# Patient Record
Sex: MALE | Race: BLACK OR AFRICAN AMERICAN | NOT HISPANIC OR LATINO
[De-identification: names, ages, dates, MRNs, and addresses within clinical notes are randomized per-mention and may not be internally consistent; named-entity substitution may affect disease eponyms.]

---

## 2019-04-24 ENCOUNTER — APPOINTMENT (OUTPATIENT)
Dept: PULMONOLOGY | Facility: CLINIC | Age: 29
End: 2019-04-24
Payer: COMMERCIAL

## 2019-04-24 PROCEDURE — 94727 GAS DIL/WSHOT DETER LNG VOL: CPT

## 2019-04-24 PROCEDURE — 94010 BREATHING CAPACITY TEST: CPT

## 2019-04-24 PROCEDURE — 94729 DIFFUSING CAPACITY: CPT

## 2019-04-24 PROCEDURE — 85018 HEMOGLOBIN: CPT | Mod: QW

## 2019-12-21 ENCOUNTER — TRANSCRIPTION ENCOUNTER (OUTPATIENT)
Age: 29
End: 2019-12-21

## 2020-01-21 ENCOUNTER — TRANSCRIPTION ENCOUNTER (OUTPATIENT)
Age: 30
End: 2020-01-21

## 2020-04-26 ENCOUNTER — MESSAGE (OUTPATIENT)
Age: 30
End: 2020-04-26

## 2020-05-05 ENCOUNTER — APPOINTMENT (OUTPATIENT)
Dept: DISASTER EMERGENCY | Facility: HOSPITAL | Age: 30
End: 2020-05-05

## 2020-05-06 LAB
SARS-COV-2 IGG SERPL IA-ACNC: <0.1 INDEX
SARS-COV-2 IGG SERPL QL IA: NEGATIVE

## 2020-07-16 ENCOUNTER — TRANSCRIPTION ENCOUNTER (OUTPATIENT)
Age: 30
End: 2020-07-16

## 2020-12-04 ENCOUNTER — TRANSCRIPTION ENCOUNTER (OUTPATIENT)
Age: 30
End: 2020-12-04

## 2021-06-01 ENCOUNTER — TRANSCRIPTION ENCOUNTER (OUTPATIENT)
Age: 31
End: 2021-06-01

## 2022-05-25 ENCOUNTER — NON-APPOINTMENT (OUTPATIENT)
Age: 32
End: 2022-05-25

## 2022-05-26 ENCOUNTER — NON-APPOINTMENT (OUTPATIENT)
Age: 32
End: 2022-05-26

## 2022-06-01 ENCOUNTER — NON-APPOINTMENT (OUTPATIENT)
Age: 32
End: 2022-06-01

## 2022-06-22 ENCOUNTER — APPOINTMENT (OUTPATIENT)
Dept: UROLOGY | Facility: CLINIC | Age: 32
End: 2022-06-22
Payer: COMMERCIAL

## 2022-06-22 VITALS
HEART RATE: 58 BPM | DIASTOLIC BLOOD PRESSURE: 63 MMHG | SYSTOLIC BLOOD PRESSURE: 106 MMHG | HEIGHT: 64 IN | BODY MASS INDEX: 26.46 KG/M2 | WEIGHT: 155 LBS

## 2022-06-22 PROCEDURE — 99204 OFFICE O/P NEW MOD 45 MIN: CPT

## 2022-06-22 NOTE — ASSESSMENT
[FreeTextEntry1] : \par plan:\par - ordered scrotal ultrasound\par - will update results over the phone\par - no need for work up for the split stream mentioned, if ever worsens or the patient interested we can do a cystoscopy to confirm no findings

## 2022-06-22 NOTE — HISTORY OF PRESENT ILLNESS
[FreeTextEntry1] : 33 yo M for initial consultation\par no PMH\par no PSH\par NKDA\par not a smoker\par \par no previous urologic history\par his ex girlfriend had trichomonas several years ago\par he was tested then and was negative\par was treated for \par \par complaining of split urine stream sometimes, no other urinary complains\par also mentioning intermittent scrotal pain, did not have anything special since 11/2021\par \par plan:\par - ordered scrotal ultrasound\par - will update results over the phone\par - no need for work up for the split stream mentioned, if ever worsens or the patient interested we can do a cystoscopy to confirm no findings

## 2022-06-22 NOTE — PHYSICAL EXAM
[General Appearance - Well Developed] : well developed [General Appearance - Well Nourished] : well nourished [Normal Appearance] : normal appearance [Well Groomed] : well groomed [General Appearance - In No Acute Distress] : no acute distress [Edema] : no peripheral edema [] : no respiratory distress [Respiration, Rhythm And Depth] : normal respiratory rhythm and effort [Exaggerated Use Of Accessory Muscles For Inspiration] : no accessory muscle use [Urethral Meatus] : meatus normal [Penis Abnormality] : normal circumcised penis [Urinary Bladder Findings] : the bladder was normal on palpation [Scrotum] : the scrotum was normal [Rectal Exam - Seminal Vesicles] : the seminal vesicles were normal [Epididymis] : the epididymides were normal [Testes Tenderness] : no tenderness of the testes [Testes Mass (___cm)] : there were no testicular masses [Normal Station and Gait] : the gait and station were normal for the patient's age [Oriented To Time, Place, And Person] : oriented to person, place, and time [Affect] : the affect was normal [Mood] : the mood was normal [Not Anxious] : not anxious [Inguinal Lymph Nodes Enlarged Bilaterally] : inguinal

## 2022-09-26 ENCOUNTER — OUTPATIENT (OUTPATIENT)
Dept: OUTPATIENT SERVICES | Facility: HOSPITAL | Age: 32
LOS: 1 days | End: 2022-09-26
Payer: COMMERCIAL

## 2022-09-26 ENCOUNTER — APPOINTMENT (OUTPATIENT)
Dept: RADIOLOGY | Facility: CLINIC | Age: 32
End: 2022-09-26

## 2022-09-26 ENCOUNTER — APPOINTMENT (OUTPATIENT)
Dept: ULTRASOUND IMAGING | Facility: CLINIC | Age: 32
End: 2022-09-26

## 2022-09-26 DIAGNOSIS — N50.82 SCROTAL PAIN: ICD-10-CM

## 2022-09-26 DIAGNOSIS — R07.89 OTHER CHEST PAIN: ICD-10-CM

## 2022-09-26 PROCEDURE — 71046 X-RAY EXAM CHEST 2 VIEWS: CPT | Mod: 26

## 2022-09-26 PROCEDURE — 93975 VASCULAR STUDY: CPT | Mod: 26

## 2022-09-26 PROCEDURE — 71046 X-RAY EXAM CHEST 2 VIEWS: CPT

## 2022-09-26 PROCEDURE — 93975 VASCULAR STUDY: CPT

## 2023-01-19 ENCOUNTER — APPOINTMENT (OUTPATIENT)
Dept: FAMILY MEDICINE | Facility: CLINIC | Age: 33
End: 2023-01-19
Payer: COMMERCIAL

## 2023-01-19 VITALS
TEMPERATURE: 97.5 F | WEIGHT: 154 LBS | SYSTOLIC BLOOD PRESSURE: 108 MMHG | OXYGEN SATURATION: 100 % | HEIGHT: 64 IN | BODY MASS INDEX: 26.29 KG/M2 | HEART RATE: 64 BPM | DIASTOLIC BLOOD PRESSURE: 64 MMHG

## 2023-01-19 DIAGNOSIS — Z78.9 OTHER SPECIFIED HEALTH STATUS: ICD-10-CM

## 2023-01-19 DIAGNOSIS — Z13.21 ENCOUNTER FOR SCREENING FOR NUTRITIONAL DISORDER: ICD-10-CM

## 2023-01-19 DIAGNOSIS — Z13.220 ENCOUNTER FOR SCREENING FOR LIPOID DISORDERS: ICD-10-CM

## 2023-01-19 DIAGNOSIS — Z60.2 PROBLEMS RELATED TO LIVING ALONE: ICD-10-CM

## 2023-01-19 DIAGNOSIS — Z23 ENCOUNTER FOR IMMUNIZATION: ICD-10-CM

## 2023-01-19 DIAGNOSIS — Z13.29 ENCOUNTER FOR SCREENING FOR OTHER SUSPECTED ENDOCRINE DISORDER: ICD-10-CM

## 2023-01-19 DIAGNOSIS — N50.82 SCROTAL PAIN: ICD-10-CM

## 2023-01-19 DIAGNOSIS — R06.09 OTHER FORMS OF DYSPNEA: ICD-10-CM

## 2023-01-19 PROCEDURE — G0444 DEPRESSION SCREEN ANNUAL: CPT | Mod: 59

## 2023-01-19 PROCEDURE — 36415 COLL VENOUS BLD VENIPUNCTURE: CPT

## 2023-01-19 PROCEDURE — 99385 PREV VISIT NEW AGE 18-39: CPT | Mod: 25

## 2023-01-19 SDOH — SOCIAL STABILITY - SOCIAL INSECURITY: PROBLEMS RELATED TO LIVING ALONE: Z60.2

## 2023-01-19 NOTE — ASSESSMENT
[FreeTextEntry1] : Annual physical: f/u routine labwork\par Moderate asthma/heavy breathing: c/w albuterol hfa prn for sob/wheezing, start symbicort daily as prescribed, advised to go to ER if condition not resolved with hfa, cxr 9/22 negative\par BMI 26: Recommend low fat diet, wt loss, exercise, nutritional counseling provided, f/u lipid panel\par Mild bradycardia: manual repeat wnl, may be related to conditioning, f/u labwork\par Screen for STDs: f/u labwork\par Mild depression: recommend exercise, yoga, meditation, c/w therapist\par RTC 1 wk

## 2023-01-19 NOTE — HISTORY OF PRESENT ILLNESS
[FreeTextEntry1] : NPA/CPE [de-identified] : 31 yo male presents for annual physical. reports having hx of asthma. He says it has worsened over the past few months, he feels he has heavy breathing/tightness 3 - 4 times per week. He does take his rescue inhaler. He was prescribed an additional medication last year by his MD which he did not  due to price.

## 2023-01-19 NOTE — HEALTH RISK ASSESSMENT
[Very Good] : ~his/her~  mood as very good [Never] : Never [Yes] : Yes [2 - 4 times a month (2 pts)] : 2-4 times a month (2 points) [1 or 2 (0 pts)] : 1 or 2 (0 points) [Never (0 pts)] : Never (0 points) [No] : In the past 12 months have you used drugs other than those required for medical reasons? No [No falls in past year] : Patient reported no falls in the past year [1] : 2) Feeling down, depressed, or hopeless for several days (1) [PHQ-2 Positive] : PHQ-2 Positive [Several Days (1)] : 5.) Poor appetite or overeating? Several days [Not at All (0)] : 8.) Moving or speaking so slowly that other people could have noticed, or the opposite, moving or speaking faster than usual? Not at all [Mild] : severity of depression is mild [Not at all] : How difficult have these problems made it for you to do your work, take care of things at home, or get along with people? Not at all [I have developed a follow-up plan documented below in the note.] : I have developed a follow-up plan documented below in the note. [Audit-CScore] : 2 [de-identified] : previously regular, needs improvement [de-identified] : needs improvement [[id number]] : 2 [[id number]] : 3 [HIV Test offered] : HIV Test offered [Hepatitis C test offered] : Hepatitis C test offered [Alone] : lives alone [Employed] : employed [Significant Other] : lives with significant other [Sexually Active] : sexually active [High Risk Behavior] : no high risk behavior [Feels Safe at Home] : Feels safe at home [Fully functional (bathing, dressing, toileting, transferring, walking, feeding)] : Fully functional (bathing, dressing, toileting, transferring, walking, feeding) [Fully functional (using the telephone, shopping, preparing meals, housekeeping, doing laundry, using] : Fully functional and needs no help or supervision to perform IADLs (using the telephone, shopping, preparing meals, housekeeping, doing laundry, using transportation, managing medications and managing finances) [Reports changes in hearing] : Reports no changes in hearing [Reports changes in vision] : Reports no changes in vision [Reports changes in dental health] : Reports no changes in dental health

## 2023-01-29 LAB
25(OH)D3 SERPL-MCNC: 17.3 NG/ML
ALBUMIN SERPL ELPH-MCNC: 5.1 G/DL
ALP BLD-CCNC: 50 U/L
ALT SERPL-CCNC: 20 U/L
ANION GAP SERPL CALC-SCNC: 12 MMOL/L
APPEARANCE: CLEAR
AST SERPL-CCNC: 21 U/L
BACTERIA: NEGATIVE
BASOPHILS # BLD AUTO: 0.02 K/UL
BASOPHILS NFR BLD AUTO: 0.6 %
BILIRUB SERPL-MCNC: 0.6 MG/DL
BILIRUBIN URINE: NEGATIVE
BLOOD URINE: NEGATIVE
BUN SERPL-MCNC: 12 MG/DL
C TRACH RRNA SPEC QL NAA+PROBE: NOT DETECTED
CALCIUM SERPL-MCNC: 10.5 MG/DL
CHLORIDE SERPL-SCNC: 100 MMOL/L
CHOLEST SERPL-MCNC: 194 MG/DL
CO2 SERPL-SCNC: 25 MMOL/L
COLOR: YELLOW
CREAT SERPL-MCNC: 0.94 MG/DL
EGFR: 110 ML/MIN/1.73M2
EOSINOPHIL # BLD AUTO: 0.11 K/UL
EOSINOPHIL NFR BLD AUTO: 3.5 %
ESTIMATED AVERAGE GLUCOSE: 114 MG/DL
GLUCOSE QUALITATIVE U: NEGATIVE
GLUCOSE SERPL-MCNC: 89 MG/DL
HBA1C MFR BLD HPLC: 5.6 %
HBV CORE IGG+IGM SER QL: NONREACTIVE
HBV CORE IGM SER QL: NONREACTIVE
HBV SURFACE AB SER QL: REACTIVE
HBV SURFACE AG SER QL: NONREACTIVE
HCT VFR BLD CALC: 45.9 %
HCV AB SER QL: NONREACTIVE
HCV S/CO RATIO: 0.09 S/CO
HDLC SERPL-MCNC: 39 MG/DL
HGB BLD-MCNC: 15.2 G/DL
HIV1+2 AB SPEC QL IA.RAPID: NONREACTIVE
HYALINE CASTS: 0 /LPF
IMM GRANULOCYTES NFR BLD AUTO: 0 %
KETONES URINE: NEGATIVE
LDLC SERPL CALC-MCNC: 145 MG/DL
LEUKOCYTE ESTERASE URINE: NEGATIVE
LYMPHOCYTES # BLD AUTO: 1.49 K/UL
LYMPHOCYTES NFR BLD AUTO: 47.9 %
MAN DIFF?: NORMAL
MCHC RBC-ENTMCNC: 29.1 PG
MCHC RBC-ENTMCNC: 33.1 GM/DL
MCV RBC AUTO: 87.8 FL
MICROSCOPIC-UA: NORMAL
MONOCYTES # BLD AUTO: 0.29 K/UL
MONOCYTES NFR BLD AUTO: 9.3 %
N GONORRHOEA RRNA SPEC QL NAA+PROBE: NOT DETECTED
NEUTROPHILS # BLD AUTO: 1.2 K/UL
NEUTROPHILS NFR BLD AUTO: 38.7 %
NITRITE URINE: NEGATIVE
NONHDLC SERPL-MCNC: 155 MG/DL
PH URINE: 8
PLATELET # BLD AUTO: 346 K/UL
POTASSIUM SERPL-SCNC: 4.9 MMOL/L
PROT SERPL-MCNC: 8 G/DL
PROTEIN URINE: NORMAL
RBC # BLD: 5.23 M/UL
RBC # FLD: 11.9 %
RED BLOOD CELLS URINE: 1 /HPF
SODIUM SERPL-SCNC: 137 MMOL/L
SOURCE AMPLIFICATION: NORMAL
SPECIFIC GRAVITY URINE: 1.02
SQUAMOUS EPITHELIAL CELLS: 0 /HPF
T PALLIDUM AB SER QL IA: NEGATIVE
TRIGL SERPL-MCNC: 50 MG/DL
TSH SERPL-ACNC: 0.96 UIU/ML
UROBILINOGEN URINE: NORMAL
WBC # FLD AUTO: 3.11 K/UL
WHITE BLOOD CELLS URINE: 0 /HPF

## 2023-02-08 ENCOUNTER — TRANSCRIPTION ENCOUNTER (OUTPATIENT)
Age: 33
End: 2023-02-08

## 2023-02-08 ENCOUNTER — APPOINTMENT (OUTPATIENT)
Dept: FAMILY MEDICINE | Facility: CLINIC | Age: 33
End: 2023-02-08
Payer: COMMERCIAL

## 2023-02-08 PROCEDURE — 36415 COLL VENOUS BLD VENIPUNCTURE: CPT

## 2023-02-14 LAB
BASOPHILS # BLD AUTO: 0.02 K/UL
BASOPHILS NFR BLD AUTO: 0.5 %
EOSINOPHIL # BLD AUTO: 0.12 K/UL
EOSINOPHIL NFR BLD AUTO: 3.3 %
HCT VFR BLD CALC: 44 %
HGB BLD-MCNC: 15.2 G/DL
IMM GRANULOCYTES NFR BLD AUTO: 0 %
LYMPHOCYTES # BLD AUTO: 1.86 K/UL
LYMPHOCYTES NFR BLD AUTO: 50.7 %
MAN DIFF?: NORMAL
MCHC RBC-ENTMCNC: 30.2 PG
MCHC RBC-ENTMCNC: 34.5 GM/DL
MCV RBC AUTO: 87.5 FL
MONOCYTES # BLD AUTO: 0.3 K/UL
MONOCYTES NFR BLD AUTO: 8.2 %
NEUTROPHILS # BLD AUTO: 1.37 K/UL
NEUTROPHILS NFR BLD AUTO: 37.3 %
PLATELET # BLD AUTO: 323 K/UL
RBC # BLD: 5.03 M/UL
RBC # FLD: 11.9 %
WBC # FLD AUTO: 3.67 K/UL

## 2023-02-27 ENCOUNTER — APPOINTMENT (OUTPATIENT)
Dept: FAMILY MEDICINE | Facility: CLINIC | Age: 33
End: 2023-02-27
Payer: COMMERCIAL

## 2023-02-27 VITALS
SYSTOLIC BLOOD PRESSURE: 106 MMHG | HEART RATE: 62 BPM | TEMPERATURE: 97.2 F | BODY MASS INDEX: 26.63 KG/M2 | WEIGHT: 156 LBS | DIASTOLIC BLOOD PRESSURE: 70 MMHG | HEIGHT: 64 IN | OXYGEN SATURATION: 98 %

## 2023-02-27 PROCEDURE — 99214 OFFICE O/P EST MOD 30 MIN: CPT

## 2023-02-27 NOTE — HISTORY OF PRESENT ILLNESS
[FreeTextEntry1] : Follow up  [de-identified] : 31 yo male presents for follow up of asthma. He was prescribed symbicort at previous visit. He says he was unable to pick it up since he did not have the money at the time. He says he has it now and would like it resent. Denies fever, chills, cp, palpitations, sob, nvcd.\par

## 2023-02-27 NOTE — ASSESSMENT
[FreeTextEntry1] : Moderate asthma: was started on symbicort at previous visit, will resend to pharmacy, c/w albuterol hfa prn for sob/wheezing, advised to go to ER if condition is not resolved with hfa\par HLD: Recommend low fat diet, wt loss, exercise, nutritional counseling provided, repeat in 4 wks\par Neutropenia: persistent, HIV negative, refer to hematology\par RTC 4 wks

## 2023-04-23 ENCOUNTER — OUTPATIENT (OUTPATIENT)
Dept: OUTPATIENT SERVICES | Facility: HOSPITAL | Age: 33
LOS: 1 days | Discharge: HOME | End: 2023-04-23

## 2023-04-23 DIAGNOSIS — D72.89 OTHER SPECIFIED DISORDERS OF WHITE BLOOD CELLS: ICD-10-CM

## 2023-04-25 ENCOUNTER — NON-APPOINTMENT (OUTPATIENT)
Age: 33
End: 2023-04-25

## 2023-04-27 ENCOUNTER — NON-APPOINTMENT (OUTPATIENT)
Age: 33
End: 2023-04-27

## 2023-04-27 ENCOUNTER — APPOINTMENT (OUTPATIENT)
Dept: HEMATOLOGY ONCOLOGY | Facility: CLINIC | Age: 33
End: 2023-04-27
Payer: COMMERCIAL

## 2023-04-27 ENCOUNTER — RESULT REVIEW (OUTPATIENT)
Age: 33
End: 2023-04-27

## 2023-04-27 VITALS
BODY MASS INDEX: 29.64 KG/M2 | DIASTOLIC BLOOD PRESSURE: 74 MMHG | SYSTOLIC BLOOD PRESSURE: 116 MMHG | HEIGHT: 60 IN | TEMPERATURE: 97.3 F | WEIGHT: 151 LBS | HEART RATE: 56 BPM | OXYGEN SATURATION: 98 %

## 2023-04-27 LAB
BASOPHILS # BLD AUTO: 0 K/UL — SIGNIFICANT CHANGE UP (ref 0–0.2)
BASOPHILS NFR BLD AUTO: 0.9 % — SIGNIFICANT CHANGE UP (ref 0–2)
EOSINOPHIL # BLD AUTO: 0.1 K/UL — SIGNIFICANT CHANGE UP (ref 0–0.5)
EOSINOPHIL NFR BLD AUTO: 3.6 % — SIGNIFICANT CHANGE UP (ref 0–6)
HCT VFR BLD CALC: 43.8 % — SIGNIFICANT CHANGE UP (ref 39–50)
HGB BLD-MCNC: 15.2 G/DL — SIGNIFICANT CHANGE UP (ref 13–17)
LYMPHOCYTES # BLD AUTO: 1.5 K/UL — SIGNIFICANT CHANGE UP (ref 1–3.3)
LYMPHOCYTES # BLD AUTO: 41 % — SIGNIFICANT CHANGE UP (ref 13–44)
MCHC RBC-ENTMCNC: 29.7 PG — SIGNIFICANT CHANGE UP (ref 27–34)
MCHC RBC-ENTMCNC: 34.6 G/DL — SIGNIFICANT CHANGE UP (ref 32–36)
MCV RBC AUTO: 85.9 FL — SIGNIFICANT CHANGE UP (ref 80–100)
MONOCYTES # BLD AUTO: 0.4 K/UL — SIGNIFICANT CHANGE UP (ref 0–0.9)
MONOCYTES NFR BLD AUTO: 12.1 % — SIGNIFICANT CHANGE UP (ref 2–14)
NEUTROPHILS # BLD AUTO: 1.6 K/UL — LOW (ref 1.8–7.4)
NEUTROPHILS NFR BLD AUTO: 42.4 % — LOW (ref 43–77)
PLATELET # BLD AUTO: 332 K/UL — SIGNIFICANT CHANGE UP (ref 150–400)
RBC # BLD: 5.1 M/UL — SIGNIFICANT CHANGE UP (ref 4.2–5.8)
RBC # FLD: 10.4 % — SIGNIFICANT CHANGE UP (ref 10.3–14.5)
WBC # BLD: 3.7 K/UL — LOW (ref 3.8–10.5)
WBC # FLD AUTO: 3.7 K/UL — LOW (ref 3.8–10.5)

## 2023-04-27 PROCEDURE — 99203 OFFICE O/P NEW LOW 30 MIN: CPT

## 2023-05-01 ENCOUNTER — NON-APPOINTMENT (OUTPATIENT)
Age: 33
End: 2023-05-01

## 2023-05-01 DIAGNOSIS — Z86.19 PERSONAL HISTORY OF OTHER INFECTIOUS AND PARASITIC DISEASES: ICD-10-CM

## 2023-05-01 LAB
ANA SER IF-ACNC: NEGATIVE
ERYTHROCYTE [SEDIMENTATION RATE] IN BLOOD BY WESTERGREN METHOD: 14 MM/HR

## 2023-05-01 NOTE — ASSESSMENT
[FreeTextEntry1] : Mr. Correa is a 31 yo AAM referred by Dr. Sesay for neutropenia.\par review of prior CBC show that his ANC has ranged between 1.2 - 1.3 since Jan. 2023. No prior CBCs are available.\par he denies any recurrent or current infection. He is not any any medication known to cause neutropenia.\par he does not have any  history of or symptoms related to an auto immune disease.\par  Today his cbc shows a WBC count of 3.7, HGB-15.2, HCT-43.8, plat-332,000, MCV-85.9, ANC- 1.6.\par I am sending off an JUAN ANTONIO and ESR to R/o an auto immune etiology for the mild neutropenia.\par Also sending off Flow Cytometry to evaluate for an inherent bone marrow cause.\par If all comes back negative , may be ethnic neutropenia. \par His ANC never dropped below 1,000. \par Asked patient to see if he could get prior CBCs from a year or more ago so we could compare.\par Will monitor CBC.

## 2023-05-01 NOTE — CONSULT LETTER
[Dear  ___] : Dear  [unfilled], [Consult Letter:] : I had the pleasure of evaluating your patient, [unfilled]. [Please see my note below.] : Please see my note below. [Consult Closing:] : Thank you very much for allowing me to participate in the care of this patient.  If you have any questions, please do not hesitate to contact me. [Sincerely,] : Sincerely, [FreeTextEntry2] : Dr. Sesay [FreeTextEntry3] : Brissa GROVES [FreeTextEntry1] : Please find attached our consultation on your patient, Mr. JEB [place] . \par We take a multidisciplinary team approach to patient care and consider you, the referring physician, an extension of our team. \par It is our commitment to provide your patient with the highest quality of advanced therapeutic options. We thank you for allowing us to participate in the care of your patient.\par Please do not hesitate to contact our team with any questions or concerns at [phone].\par \par \par

## 2023-05-01 NOTE — HISTORY OF PRESENT ILLNESS
[de-identified] : Mr. Correa is a 31 yo AAM referred by Dr. Sesay for neutropenia.\par review of prior CBC show that his ANC has ranged between 1.2 - 1.3 since Jan. 2023. No prior CBCs are available.\par he denies any recurrent or current infection. He is not any any medication known to cause neutropenia.\par he does not have any  history of or symptoms related to an auto immune disease. [de-identified] : No symptoms related to infection, no fevers chills or night sweats.

## 2023-05-02 ENCOUNTER — APPOINTMENT (OUTPATIENT)
Dept: ORTHOPEDIC SURGERY | Facility: CLINIC | Age: 33
End: 2023-05-02
Payer: COMMERCIAL

## 2023-05-02 VITALS
DIASTOLIC BLOOD PRESSURE: 70 MMHG | WEIGHT: 151 LBS | HEIGHT: 60 IN | HEART RATE: 51 BPM | BODY MASS INDEX: 29.64 KG/M2 | SYSTOLIC BLOOD PRESSURE: 110 MMHG

## 2023-05-02 DIAGNOSIS — S63.633D SPRAIN OF INTERPHALANGEAL JOINT OF LEFT MIDDLE FINGER, SUBSEQUENT ENCOUNTER: ICD-10-CM

## 2023-05-02 DIAGNOSIS — S63.635D SPRAIN OF INTERPHALANGEAL JOINT OF LEFT RING FINGER, SUBSEQUENT ENCOUNTER: ICD-10-CM

## 2023-05-02 PROCEDURE — 99203 OFFICE O/P NEW LOW 30 MIN: CPT

## 2023-05-02 PROCEDURE — 73130 X-RAY EXAM OF HAND: CPT | Mod: LT

## 2023-05-03 NOTE — ASSESSMENT
[FreeTextEntry1] : Patient presents with jamming injuries to the left middle and ring fingers. Their symptoms are consistent with a sprain of both PIP joints. The nature of this condition was described at length with the patient and they verbalized understanding. \par \par Recommendations: \par -ROM exercises (HEP) that were described/demonstrated for the patient at length at this evening's visit\par -Rx for Hand Therapy was given to the patient. To work on ROM and swelling abatement to improve activity tolerance\par -OTC anti-inflammatories for discomfort\par -Followup with Dr. Galindo in 6 weeks for ROM check and further evaluation (if needed).\par -Patient was given ample time to ask questions and all questions were answered to the patient's full satisfaction.\par \par The patient was in full agreement with this plan and appreciative of their care.\par

## 2023-05-03 NOTE — HISTORY OF PRESENT ILLNESS
[FreeTextEntry1] : 05/02/2023: Patient is a 32 year-old, ambidextrous male who presents to the office today for initial evaluation of injuries to his left middle and ring fingers. He was playing basketball back in February when a ball came in and jammed both of his fingers on the left hand. He was seen at Urgent Care by the PA and diagnosed with a left middle finger jamming injury. There was no xray tech on site for Urgent Care this day and he was referred to our clinic for further evaluation. Patient complains of PIP joint stiffness of both fingers due to slightly decreasing ROM from the stiffness. He denies any upper extremity paresthesias.\par \par No bleeding, fever, chills, sweats, nausea or vomiting were endorsed at this visit. The above history is in addition to the intake form, which I personally reviewed at length, including the patient's medical, surgical, and family history. The patient's allergies were also carefully reviewed. In addition, the family and social history of the patient were also reviewed, which are non-contributory to this visit unless specified above. All of this has been documented accordingly in the visit note.\par

## 2023-05-03 NOTE — PHYSICAL EXAM
[Normal Finger/nose] : finger to nose coordination [Normal] : no peripheral adenopathy appreciated [de-identified] : Left Hand Exam\par \par Skin: Intact with no erythema, no ecchymosis\par ROM: Full ROM of the digits and wrist. Patient able to make a full composite fist, however, subjectively complains that he feels like there is something blocking him from full flexion at the PIP joint.\par Tenderness: No TTP to PIP joints, no TTP to DIP joints, no TTP to MCP joints, negative basal joint grind test.  \par Sensation: Grossly intact without deficits\par Capillary Refill: Brisk, <2 seconds\par  [de-identified] : LETTYR [de-identified] : 3 xray views of the left middle and ring fingers were obtained.\par \par -No fractures or dislocations are noted

## 2023-05-04 ENCOUNTER — NON-APPOINTMENT (OUTPATIENT)
Age: 33
End: 2023-05-04

## 2023-05-07 PROBLEM — S63.633D SPRAIN OF INTERPHALANGEAL JOINT OF LEFT MIDDLE FINGER, SUBSEQUENT ENCOUNTER: Status: ACTIVE | Noted: 2023-05-02

## 2023-07-23 ENCOUNTER — OUTPATIENT (OUTPATIENT)
Dept: OUTPATIENT SERVICES | Facility: HOSPITAL | Age: 33
LOS: 1 days | Discharge: HOME | End: 2023-07-23

## 2023-07-23 DIAGNOSIS — D72.89 OTHER SPECIFIED DISORDERS OF WHITE BLOOD CELLS: ICD-10-CM

## 2023-07-28 ENCOUNTER — RESULT REVIEW (OUTPATIENT)
Age: 33
End: 2023-07-28

## 2023-07-28 ENCOUNTER — APPOINTMENT (OUTPATIENT)
Dept: HEMATOLOGY ONCOLOGY | Facility: CLINIC | Age: 33
End: 2023-07-28
Payer: COMMERCIAL

## 2023-07-28 VITALS
OXYGEN SATURATION: 97 % | HEIGHT: 66.14 IN | HEART RATE: 53 BPM | DIASTOLIC BLOOD PRESSURE: 75 MMHG | BODY MASS INDEX: 24.39 KG/M2 | WEIGHT: 151.79 LBS | SYSTOLIC BLOOD PRESSURE: 123 MMHG

## 2023-07-28 LAB
BASOPHILS # BLD AUTO: 0.1 K/UL — SIGNIFICANT CHANGE UP (ref 0–0.2)
BASOPHILS NFR BLD AUTO: 1.9 % — SIGNIFICANT CHANGE UP (ref 0–2)
EOSINOPHIL # BLD AUTO: 0.1 K/UL — SIGNIFICANT CHANGE UP (ref 0–0.5)
EOSINOPHIL NFR BLD AUTO: 2.2 % — SIGNIFICANT CHANGE UP (ref 0–6)
HCT VFR BLD CALC: 46.8 % — SIGNIFICANT CHANGE UP (ref 39–50)
HGB BLD-MCNC: 17 G/DL — SIGNIFICANT CHANGE UP (ref 13–17)
LYMPHOCYTES # BLD AUTO: 1.9 K/UL — SIGNIFICANT CHANGE UP (ref 1–3.3)
LYMPHOCYTES # BLD AUTO: 30.9 % — SIGNIFICANT CHANGE UP (ref 13–44)
MCHC RBC-ENTMCNC: 31.4 PG — SIGNIFICANT CHANGE UP (ref 27–34)
MCHC RBC-ENTMCNC: 36.3 G/DL — HIGH (ref 32–36)
MCV RBC AUTO: 86.6 FL — SIGNIFICANT CHANGE UP (ref 80–100)
MONOCYTES # BLD AUTO: 0.4 K/UL — SIGNIFICANT CHANGE UP (ref 0–0.9)
MONOCYTES NFR BLD AUTO: 7.1 % — SIGNIFICANT CHANGE UP (ref 2–14)
NEUTROPHILS # BLD AUTO: 3.5 K/UL — SIGNIFICANT CHANGE UP (ref 1.8–7.4)
NEUTROPHILS NFR BLD AUTO: 57.9 % — SIGNIFICANT CHANGE UP (ref 43–77)
PLATELET # BLD AUTO: 339 K/UL — SIGNIFICANT CHANGE UP (ref 150–400)
RBC # BLD: 5.4 M/UL — SIGNIFICANT CHANGE UP (ref 4.2–5.8)
RBC # FLD: 10.4 % — SIGNIFICANT CHANGE UP (ref 10.3–14.5)
WBC # BLD: 6 K/UL — SIGNIFICANT CHANGE UP (ref 3.8–10.5)
WBC # FLD AUTO: 6 K/UL — SIGNIFICANT CHANGE UP (ref 3.8–10.5)

## 2023-07-28 PROCEDURE — 99214 OFFICE O/P EST MOD 30 MIN: CPT

## 2023-08-03 NOTE — ASSESSMENT
[FreeTextEntry1] : Mr. Correa is a 33 yo AAM referred by Dr. Sesay for neutropenia. review of prior CBC show that his ANC has ranged between 1.2 - 1.3 since Jan. 2023. No prior CBCs are available. he denies any recurrent or current infection. He is not any any medication known to cause neutropenia. he does not have any  history of or symptoms related to an auto immune disease.   4/27/23 Flow Cytometry Peripheral blood:      - The lymphocyte immunophenotypic findings show no diagnostic abnormalities.      - The myeloid immunophenotypic findings show normal myeloid granularity, no increase in myeloid immaturity, and normal myeloid antigen maturation pattern. INTERPRETATION: MORPHOLOGY: No significant absolute lymphocytosis, or monocytosis. Neutropenia is noted. -JUAN ANTONIO and ESR normal on 4/27/23 -If all comes back negative , may be ethnic neutropenia. ANC never dropped below 1,000.  - Today's CBC: WBC 6.0 , Hg 17 , HCT 46.8 , plt 339, ANC 3.6 - Advised to repeat CBC in 6 months. Patient wishes to have PCP monitor CBC - F/U PRN

## 2023-08-03 NOTE — HISTORY OF PRESENT ILLNESS
[de-identified] : Mr. Correa is a 33 yo AAM referred by Dr. Sesay for neutropenia.\par review of prior CBC show that his ANC has ranged between 1.2 - 1.3 since Jan. 2023. No prior CBCs are available.\par he denies any recurrent or current infection. He is not any any medication known to cause neutropenia.\par he does not have any  history of or symptoms related to an auto immune disease. [de-identified] : Patient presents for f/u  Today's CBC: WBC 6.0 , Hg 17 , HCT 46.8 , plt 339, ANC 3.6  Reports PND last week, took Mucinex and resolved  Changed diet- following nutritionist , low carb, high protein//fiber  No symptoms related to infection, no fevers chills or night sweats.  4/27/23 Flow Cytometry Peripheral blood:      - The lymphocyte immunophenotypic findings show no diagnostic abnormalities.      - The myeloid immunophenotypic findings show normal myeloid granularity, no increase in myeloid immaturity, and normal myeloid antigen maturation pattern. INTERPRETATION: MORPHOLOGY: No significant absolute lymphocytosis, or monocytosis. Neutropenia is noted.

## 2023-08-09 ENCOUNTER — APPOINTMENT (OUTPATIENT)
Dept: FAMILY MEDICINE | Facility: CLINIC | Age: 33
End: 2023-08-09

## 2023-08-24 ENCOUNTER — APPOINTMENT (OUTPATIENT)
Dept: FAMILY MEDICINE | Facility: CLINIC | Age: 33
End: 2023-08-24
Payer: COMMERCIAL

## 2023-08-24 VITALS
HEIGHT: 66 IN | OXYGEN SATURATION: 98 % | WEIGHT: 150 LBS | BODY MASS INDEX: 24.11 KG/M2 | DIASTOLIC BLOOD PRESSURE: 70 MMHG | HEART RATE: 75 BPM | SYSTOLIC BLOOD PRESSURE: 110 MMHG

## 2023-08-24 DIAGNOSIS — Z02.1 ENCOUNTER FOR PRE-EMPLOYMENT EXAMINATION: ICD-10-CM

## 2023-08-24 DIAGNOSIS — R06.89 OTHER ABNORMALITIES OF BREATHING: ICD-10-CM

## 2023-08-24 PROCEDURE — 99214 OFFICE O/P EST MOD 30 MIN: CPT

## 2023-08-24 RX ORDER — VITAMIN K2 90 MCG
125 MCG CAPSULE ORAL
Qty: 90 | Refills: 0 | Status: DISCONTINUED | COMMUNITY
Start: 2023-01-29 | End: 2023-08-24

## 2023-08-24 RX ORDER — DICLOFENAC SODIUM 75 MG/1
75 TABLET, DELAYED RELEASE ORAL
Qty: 60 | Refills: 0 | Status: DISCONTINUED | COMMUNITY
Start: 2023-05-02 | End: 2023-08-24

## 2023-08-24 NOTE — ASSESSMENT
[FreeTextEntry1] : HLD: Recommend low fat diet, wt loss, exercise, nutritional counseling provided, f/u lipid panel Asthma: well controlled, c/w albuterol hfa prn for sob/wheezing, c/w symbicort as prescribed, advised to go to ER if condition is not resolved with hfa Vitamin D def: f/u level Pre-employment: f/u quantiferon RTC 3 wks

## 2023-08-24 NOTE — HISTORY OF PRESENT ILLNESS
[FreeTextEntry1] : Follow up  [de-identified] : 32 yo male presents for follow up of HLD. reports feeling well. Denies fever, chills, cp, palpitations, sob, nvcd.

## 2023-08-29 LAB
25(OH)D3 SERPL-MCNC: 30.1 NG/ML
CHOLEST SERPL-MCNC: 201 MG/DL
HDLC SERPL-MCNC: 41 MG/DL
LDLC SERPL CALC-MCNC: 151 MG/DL
M TB IFN-G BLD-IMP: NEGATIVE
NONHDLC SERPL-MCNC: 160 MG/DL
QUANTIFERON TB PLUS MITOGEN MINUS NIL: 7.55 IU/ML
QUANTIFERON TB PLUS NIL: 0.02 IU/ML
QUANTIFERON TB PLUS TB1 MINUS NIL: 0.02 IU/ML
QUANTIFERON TB PLUS TB2 MINUS NIL: 0.04 IU/ML
TRIGL SERPL-MCNC: 54 MG/DL

## 2023-09-22 ENCOUNTER — NON-APPOINTMENT (OUTPATIENT)
Age: 33
End: 2023-09-22

## 2024-03-04 ENCOUNTER — APPOINTMENT (OUTPATIENT)
Dept: DERMATOLOGY | Facility: CLINIC | Age: 34
End: 2024-03-04

## 2024-04-17 ENCOUNTER — APPOINTMENT (OUTPATIENT)
Dept: DERMATOLOGY | Facility: CLINIC | Age: 34
End: 2024-04-17
Payer: COMMERCIAL

## 2024-04-17 DIAGNOSIS — L70.0 ACNE VULGARIS: ICD-10-CM

## 2024-04-17 DIAGNOSIS — L81.0 POSTINFLAMMATORY HYPERPIGMENTATION: ICD-10-CM

## 2024-04-17 DIAGNOSIS — L64.9 ANDROGENIC ALOPECIA, UNSPECIFIED: ICD-10-CM

## 2024-04-17 PROCEDURE — 99203 OFFICE O/P NEW LOW 30 MIN: CPT

## 2024-04-17 NOTE — ASSESSMENT
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  Component of traction alopecia as well

## 2024-04-17 NOTE — PLAN
[TextEntry] : Start minoxidil 2.5 mg p.o. once a day Start minoxidil foam 5% to scalp twice a day Option of transplants discussed  Note: Patient lives alone-would have difficulty applying medication to back Start 10% benzoyl peroxide wash in shower  Photos taken  Refer to [facility] office for possible treatment of the postinflammatory hyperpigmentation  Return 3 months

## 2024-04-17 NOTE — HISTORY OF PRESENT ILLNESS
[FreeTextEntry1] : Acne and hair loss [de-identified] : First visit for 33-year-old  male with a 5-year history of acne on the back. No no previous treatment. No history of steroids  Patient also complains of hair loss-especially in the anterior scalp Many males on patient's father side are bald.

## 2024-04-17 NOTE — PHYSICAL EXAM
[FreeTextEntry3] : Type V skin  Scalp: Moderate marked thinning of the anterior crown Note: Patient wearing hair in dreads  Back: Mild small hyperpigmented papules Multiple macular hyperpigmentation

## 2024-04-26 ENCOUNTER — APPOINTMENT (OUTPATIENT)
Dept: FAMILY MEDICINE | Facility: CLINIC | Age: 34
End: 2024-04-26
Payer: COMMERCIAL

## 2024-04-26 ENCOUNTER — NON-APPOINTMENT (OUTPATIENT)
Age: 34
End: 2024-04-26

## 2024-04-26 VITALS
BODY MASS INDEX: 24.75 KG/M2 | TEMPERATURE: 98 F | DIASTOLIC BLOOD PRESSURE: 74 MMHG | WEIGHT: 154 LBS | OXYGEN SATURATION: 98 % | SYSTOLIC BLOOD PRESSURE: 118 MMHG | HEART RATE: 65 BPM | HEIGHT: 66 IN

## 2024-04-26 DIAGNOSIS — E78.5 HYPERLIPIDEMIA, UNSPECIFIED: ICD-10-CM

## 2024-04-26 DIAGNOSIS — F32.A DEPRESSION, UNSPECIFIED: ICD-10-CM

## 2024-04-26 DIAGNOSIS — D70.9 NEUTROPENIA, UNSPECIFIED: ICD-10-CM

## 2024-04-26 DIAGNOSIS — E55.9 VITAMIN D DEFICIENCY, UNSPECIFIED: ICD-10-CM

## 2024-04-26 DIAGNOSIS — R00.1 BRADYCARDIA, UNSPECIFIED: ICD-10-CM

## 2024-04-26 DIAGNOSIS — Z00.00 ENCOUNTER FOR GENERAL ADULT MEDICAL EXAMINATION W/OUT ABNORMAL FINDINGS: ICD-10-CM

## 2024-04-26 DIAGNOSIS — J34.2 DEVIATED NASAL SEPTUM: ICD-10-CM

## 2024-04-26 DIAGNOSIS — Z13.1 ENCOUNTER FOR SCREENING FOR DIABETES MELLITUS: ICD-10-CM

## 2024-04-26 DIAGNOSIS — R42 DIZZINESS AND GIDDINESS: ICD-10-CM

## 2024-04-26 DIAGNOSIS — J45.909 UNSPECIFIED ASTHMA, UNCOMPLICATED: ICD-10-CM

## 2024-04-26 DIAGNOSIS — S69.92XD UNSPECIFIED INJURY OF LEFT WRIST, HAND AND FINGER(S), SUBSEQUENT ENCOUNTER: ICD-10-CM

## 2024-04-26 DIAGNOSIS — Z11.3 ENCOUNTER FOR SCREENING FOR INFECTIONS WITH A PREDOMINANTLY SEXUAL MODE OF TRANSMISSION: ICD-10-CM

## 2024-04-26 PROCEDURE — 93000 ELECTROCARDIOGRAM COMPLETE: CPT

## 2024-04-26 PROCEDURE — 99213 OFFICE O/P EST LOW 20 MIN: CPT | Mod: 25

## 2024-04-26 PROCEDURE — 99395 PREV VISIT EST AGE 18-39: CPT

## 2024-04-26 RX ORDER — ALBUTEROL SULFATE 90 UG/1
108 (90 BASE) INHALANT RESPIRATORY (INHALATION) EVERY 4 HOURS
Qty: 1 | Refills: 2 | Status: ACTIVE | COMMUNITY
Start: 2023-01-19 | End: 1900-01-01

## 2024-04-26 RX ORDER — BUDESONIDE AND FORMOTEROL FUMARATE DIHYDRATE 160; 4.5 UG/1; UG/1
160-4.5 AEROSOL RESPIRATORY (INHALATION) TWICE DAILY
Qty: 1 | Refills: 2 | Status: ACTIVE | COMMUNITY
Start: 2023-01-19 | End: 1900-01-01

## 2024-04-26 NOTE — ASSESSMENT
[FreeTextEntry1] : Annual physical: f/u routine labwork Intermittent lightheadedness: intermittent sensation of lightheadedness, EKG bradycardia, no neuro deficits, no room spinning, no e/o vertigo, f/u CT head noncontrast ordered, f/u labwork including CBC/CMP/TSH ordered Bradycardia: mild, suspect due to conditioning, f/u labwork including TSH/CBC/Mag/phos, EKG bradycardia, manual repeat 65 bpm HLD: Recommend low fat diet, wt loss, exercise, nutritional counseling provided, f/u lipid panel Asthma: well controlled, c/w albuterol hfa prn for sob/wheezing, advised to go to ER if condition is not resolved with hfa, c/w bud/form as prescribed 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

## 2024-04-26 NOTE — HEALTH RISK ASSESSMENT
[Good] : ~his/her~  mood as  good [Yes] : Yes [Monthly or less (1 pt)] : Monthly or less (1 point) [1 or 2 (0 pts)] : 1 or 2 (0 points) [Never (0 pts)] : Never (0 points) [No] : In the past 12 months have you used drugs other than those required for medical reasons? No [0] : 2) Feeling down, depressed, or hopeless: Not at all (0) [PHQ-2 Negative - No further assessment needed] : PHQ-2 Negative - No further assessment needed [HIV Test offered] : HIV Test offered [Hepatitis C test offered] : Hepatitis C test offered [Alone] : lives alone [Employed] : employed [Single] : single [Feels Safe at Home] : Feels safe at home [Fully functional (bathing, dressing, toileting, transferring, walking, feeding)] : Fully functional (bathing, dressing, toileting, transferring, walking, feeding) [Fully functional (using the telephone, shopping, preparing meals, housekeeping, doing laundry, using] : Fully functional and needs no help or supervision to perform IADLs (using the telephone, shopping, preparing meals, housekeeping, doing laundry, using transportation, managing medications and managing finances) [Never] : Never [Audit-CScore] : 1 [de-identified] : regularly [de-identified] : healthy [[id number]] : 0 [Sexually Active] : not sexually active [High Risk Behavior] : no high risk behavior [Reports changes in hearing] : Reports no changes in hearing [Reports changes in vision] : Reports no changes in vision [Reports changes in dental health] : Reports no changes in dental health

## 2024-04-26 NOTE — HISTORY OF PRESENT ILLNESS
[FreeTextEntry1] : JEB is a 33-year-old male here for a CPE [de-identified] : 32 yo male presents for annual physical. Reports concern for intermittent lightheadedness. He says it has had several episodes of lightheadedness in the morning when he wakes up. he says it is not room spinning, it lasts for 5 minutes and resolves. Its been going on for the past 3 months. In addition, he notes concern for blockage in his left nostril. Denies fever, chills, cp, palpitations, sob, nvcd.

## 2024-05-01 LAB
25(OH)D3 SERPL-MCNC: 19.8 NG/ML
ALBUMIN SERPL ELPH-MCNC: 4.7 G/DL
ALP BLD-CCNC: 56 U/L
ALT SERPL-CCNC: 22 U/L
ANION GAP SERPL CALC-SCNC: 12 MMOL/L
APPEARANCE: CLEAR
AST SERPL-CCNC: 19 U/L
BACTERIA: NEGATIVE /HPF
BASOPHILS # BLD AUTO: 0.03 K/UL
BASOPHILS NFR BLD AUTO: 0.7 %
BILIRUB SERPL-MCNC: 0.3 MG/DL
BILIRUBIN URINE: NEGATIVE
BLOOD URINE: NEGATIVE
BUN SERPL-MCNC: 11 MG/DL
C TRACH RRNA SPEC QL NAA+PROBE: NOT DETECTED
CALCIUM SERPL-MCNC: 9.8 MG/DL
CAST: 0 /LPF
CHLORIDE SERPL-SCNC: 102 MMOL/L
CHOLEST SERPL-MCNC: 180 MG/DL
CO2 SERPL-SCNC: 26 MMOL/L
COLOR: YELLOW
CREAT SERPL-MCNC: 1.15 MG/DL
EGFR: 86 ML/MIN/1.73M2
EOSINOPHIL # BLD AUTO: 0.2 K/UL
EOSINOPHIL NFR BLD AUTO: 5 %
EPITHELIAL CELLS: 0 /HPF
ESTIMATED AVERAGE GLUCOSE: 114 MG/DL
GLUCOSE QUALITATIVE U: NEGATIVE MG/DL
GLUCOSE SERPL-MCNC: 103 MG/DL
HBA1C MFR BLD HPLC: 5.6 %
HBV CORE IGG+IGM SER QL: NONREACTIVE
HBV CORE IGM SER QL: NONREACTIVE
HBV E AB SER QL: NONREACTIVE
HBV E AG SER QL: NONREACTIVE
HBV SURFACE AB SER QL: REACTIVE
HBV SURFACE AG SER QL: NONREACTIVE
HCT VFR BLD CALC: 44 %
HCV AB SER QL: NONREACTIVE
HCV S/CO RATIO: 0.06 S/CO
HDLC SERPL-MCNC: 37 MG/DL
HGB BLD-MCNC: 14.7 G/DL
HIV1+2 AB SPEC QL IA.RAPID: NONREACTIVE
IMM GRANULOCYTES NFR BLD AUTO: 0 %
KETONES URINE: NEGATIVE MG/DL
LDLC SERPL CALC-MCNC: 122 MG/DL
LEUKOCYTE ESTERASE URINE: ABNORMAL
LYMPHOCYTES # BLD AUTO: 1.65 K/UL
LYMPHOCYTES NFR BLD AUTO: 41 %
MAGNESIUM SERPL-MCNC: 2.2 MG/DL
MAN DIFF?: NORMAL
MCHC RBC-ENTMCNC: 30.1 PG
MCHC RBC-ENTMCNC: 33.4 GM/DL
MCV RBC AUTO: 90.2 FL
MICROSCOPIC-UA: NORMAL
MONOCYTES # BLD AUTO: 0.47 K/UL
MONOCYTES NFR BLD AUTO: 11.7 %
N GONORRHOEA RRNA SPEC QL NAA+PROBE: NOT DETECTED
NEUTROPHILS # BLD AUTO: 1.67 K/UL
NEUTROPHILS NFR BLD AUTO: 41.6 %
NITRITE URINE: NEGATIVE
NONHDLC SERPL-MCNC: 143 MG/DL
PH URINE: 8
PHOSPHATE SERPL-MCNC: 3.6 MG/DL
PLATELET # BLD AUTO: 297 K/UL
POTASSIUM SERPL-SCNC: 4.6 MMOL/L
PROT SERPL-MCNC: 7.5 G/DL
PROTEIN URINE: 30 MG/DL
RAPID RVP RESULT: DETECTED
RBC # BLD: 4.88 M/UL
RBC # FLD: 11.9 %
RED BLOOD CELLS URINE: 0 /HPF
RV+EV RNA SPEC QL NAA+PROBE: DETECTED
SARS-COV-2 RNA PNL RESP NAA+PROBE: NOT DETECTED
SODIUM SERPL-SCNC: 140 MMOL/L
SOURCE AMPLIFICATION: NORMAL
SPECIFIC GRAVITY URINE: 1.03
T PALLIDUM AB SER QL IA: NEGATIVE
TRIGL SERPL-MCNC: 112 MG/DL
TSH SERPL-ACNC: 1.51 UIU/ML
UROBILINOGEN URINE: 1 MG/DL
WBC # FLD AUTO: 4.02 K/UL
WHITE BLOOD CELLS URINE: 0 /HPF

## 2024-05-10 ENCOUNTER — OUTPATIENT (OUTPATIENT)
Dept: OUTPATIENT SERVICES | Facility: HOSPITAL | Age: 34
LOS: 1 days | End: 2024-05-10
Payer: COMMERCIAL

## 2024-05-10 ENCOUNTER — APPOINTMENT (OUTPATIENT)
Dept: CT IMAGING | Facility: CLINIC | Age: 34
End: 2024-05-10
Payer: COMMERCIAL

## 2024-05-10 DIAGNOSIS — R42 DIZZINESS AND GIDDINESS: ICD-10-CM

## 2024-05-10 PROCEDURE — 70450 CT HEAD/BRAIN W/O DYE: CPT

## 2024-05-10 PROCEDURE — 70450 CT HEAD/BRAIN W/O DYE: CPT | Mod: 26

## 2024-05-13 DIAGNOSIS — R93.0 ABNORMAL FINDINGS ON DIAGNOSTIC IMAGING OF SKULL AND HEAD, NOT ELSEWHERE CLASSIFIED: ICD-10-CM

## 2024-05-29 ENCOUNTER — APPOINTMENT (OUTPATIENT)
Dept: OTOLARYNGOLOGY | Facility: CLINIC | Age: 34
End: 2024-05-29

## 2024-09-15 ENCOUNTER — NON-APPOINTMENT (OUTPATIENT)
Age: 34
End: 2024-09-15

## 2025-01-16 ENCOUNTER — NON-APPOINTMENT (OUTPATIENT)
Age: 35
End: 2025-01-16

## 2025-04-17 ENCOUNTER — NON-APPOINTMENT (OUTPATIENT)
Age: 35
End: 2025-04-17

## 2025-04-17 ENCOUNTER — APPOINTMENT (OUTPATIENT)
Dept: OPHTHALMOLOGY | Facility: CLINIC | Age: 35
End: 2025-04-17
Payer: COMMERCIAL

## 2025-04-17 PROCEDURE — 92002 INTRM OPH EXAM NEW PATIENT: CPT

## 2025-04-17 PROCEDURE — 92133 CPTRZD OPH DX IMG PST SGM ON: CPT

## 2025-04-30 ENCOUNTER — APPOINTMENT (OUTPATIENT)
Dept: FAMILY MEDICINE | Facility: CLINIC | Age: 35
End: 2025-04-30
Payer: COMMERCIAL

## 2025-04-30 VITALS
DIASTOLIC BLOOD PRESSURE: 80 MMHG | SYSTOLIC BLOOD PRESSURE: 120 MMHG | WEIGHT: 154 LBS | HEIGHT: 66 IN | HEART RATE: 80 BPM | OXYGEN SATURATION: 97 % | BODY MASS INDEX: 24.75 KG/M2

## 2025-04-30 DIAGNOSIS — E78.5 HYPERLIPIDEMIA, UNSPECIFIED: ICD-10-CM

## 2025-04-30 DIAGNOSIS — Z13.29 ENCOUNTER FOR SCREENING FOR OTHER SUSPECTED ENDOCRINE DISORDER: ICD-10-CM

## 2025-04-30 DIAGNOSIS — Z00.00 ENCOUNTER FOR GENERAL ADULT MEDICAL EXAMINATION W/OUT ABNORMAL FINDINGS: ICD-10-CM

## 2025-04-30 DIAGNOSIS — L81.0 POSTINFLAMMATORY HYPERPIGMENTATION: ICD-10-CM

## 2025-04-30 DIAGNOSIS — L64.9 ANDROGENIC ALOPECIA, UNSPECIFIED: ICD-10-CM

## 2025-04-30 DIAGNOSIS — F32.A DEPRESSION, UNSPECIFIED: ICD-10-CM

## 2025-04-30 DIAGNOSIS — R00.1 BRADYCARDIA, UNSPECIFIED: ICD-10-CM

## 2025-04-30 DIAGNOSIS — Z86.39 PERSONAL HISTORY OF OTHER ENDOCRINE, NUTRITIONAL AND METABOLIC DISEASE: ICD-10-CM

## 2025-04-30 DIAGNOSIS — J45.909 UNSPECIFIED ASTHMA, UNCOMPLICATED: ICD-10-CM

## 2025-04-30 DIAGNOSIS — J34.2 DEVIATED NASAL SEPTUM: ICD-10-CM

## 2025-04-30 DIAGNOSIS — Z11.3 ENCOUNTER FOR SCREENING FOR INFECTIONS WITH A PREDOMINANTLY SEXUAL MODE OF TRANSMISSION: ICD-10-CM

## 2025-04-30 DIAGNOSIS — Z13.1 ENCOUNTER FOR SCREENING FOR DIABETES MELLITUS: ICD-10-CM

## 2025-04-30 DIAGNOSIS — D70.9 NEUTROPENIA, UNSPECIFIED: ICD-10-CM

## 2025-04-30 DIAGNOSIS — R42 DIZZINESS AND GIDDINESS: ICD-10-CM

## 2025-04-30 PROCEDURE — 99395 PREV VISIT EST AGE 18-39: CPT

## 2025-05-01 LAB
ALBUMIN SERPL ELPH-MCNC: 4.7 G/DL
ALP BLD-CCNC: 44 U/L
ALT SERPL-CCNC: 40 U/L
ANION GAP SERPL CALC-SCNC: 14 MMOL/L
APPEARANCE: CLEAR
AST SERPL-CCNC: 53 U/L
BACTERIA: NEGATIVE /HPF
BASOPHILS # BLD AUTO: 0.02 K/UL
BASOPHILS NFR BLD AUTO: 0.5 %
BILIRUB SERPL-MCNC: 0.5 MG/DL
BILIRUBIN URINE: NEGATIVE
BLOOD URINE: NEGATIVE
BUN SERPL-MCNC: 14 MG/DL
C TRACH RRNA SPEC QL NAA+PROBE: NOT DETECTED
CALCIUM SERPL-MCNC: 10 MG/DL
CAST: 0 /LPF
CHLORIDE SERPL-SCNC: 102 MMOL/L
CHOLEST SERPL-MCNC: 198 MG/DL
CO2 SERPL-SCNC: 22 MMOL/L
COLOR: YELLOW
CREAT SERPL-MCNC: 1.16 MG/DL
EGFRCR SERPLBLD CKD-EPI 2021: 84 ML/MIN/1.73M2
EOSINOPHIL # BLD AUTO: 0.12 K/UL
EOSINOPHIL NFR BLD AUTO: 3.1 %
EPITHELIAL CELLS: 0 /HPF
ESTIMATED AVERAGE GLUCOSE: 108 MG/DL
GLUCOSE QUALITATIVE U: NEGATIVE MG/DL
GLUCOSE SERPL-MCNC: 117 MG/DL
HBA1C MFR BLD HPLC: 5.4 %
HBV CORE IGG+IGM SER QL: NONREACTIVE
HBV CORE IGM SER QL: NONREACTIVE
HBV E AB SER QL: NONREACTIVE
HBV E AG SER QL: NONREACTIVE
HBV SURFACE AB SER QL: REACTIVE
HBV SURFACE AG SER QL: NONREACTIVE
HCT VFR BLD CALC: 43.8 %
HCV AB SER QL: NONREACTIVE
HCV S/CO RATIO: 0.09 S/CO
HDLC SERPL-MCNC: 37 MG/DL
HGB BLD-MCNC: 14.4 G/DL
HIV1+2 AB SPEC QL IA.RAPID: NONREACTIVE
IMM GRANULOCYTES NFR BLD AUTO: 0 %
KETONES URINE: NEGATIVE MG/DL
LDLC SERPL-MCNC: 146 MG/DL
LEUKOCYTE ESTERASE URINE: NEGATIVE
LYMPHOCYTES # BLD AUTO: 1.68 K/UL
LYMPHOCYTES NFR BLD AUTO: 43.2 %
MAN DIFF?: NORMAL
MCHC RBC-ENTMCNC: 30.5 PG
MCHC RBC-ENTMCNC: 32.9 G/DL
MCV RBC AUTO: 92.8 FL
MICROSCOPIC-UA: NORMAL
MONOCYTES # BLD AUTO: 0.3 K/UL
MONOCYTES NFR BLD AUTO: 7.7 %
N GONORRHOEA RRNA SPEC QL NAA+PROBE: NOT DETECTED
NEUTROPHILS # BLD AUTO: 1.77 K/UL
NEUTROPHILS NFR BLD AUTO: 45.5 %
NITRITE URINE: NEGATIVE
NONHDLC SERPL-MCNC: 161 MG/DL
PH URINE: 5.5
PLATELET # BLD AUTO: 312 K/UL
POTASSIUM SERPL-SCNC: 4.3 MMOL/L
PROT SERPL-MCNC: 7.4 G/DL
PROTEIN URINE: NEGATIVE MG/DL
RBC # BLD: 4.72 M/UL
RBC # FLD: 12.3 %
RED BLOOD CELLS URINE: 0 /HPF
SODIUM SERPL-SCNC: 138 MMOL/L
SOURCE AMPLIFICATION: NORMAL
SPECIFIC GRAVITY URINE: 1.03
T PALLIDUM AB SER QL IA: NEGATIVE
TRIGL SERPL-MCNC: 87 MG/DL
TSH SERPL-ACNC: 0.8 UIU/ML
UROBILINOGEN URINE: 0.2 MG/DL
WBC # FLD AUTO: 3.89 K/UL
WHITE BLOOD CELLS URINE: 0 /HPF

## 2025-05-09 ENCOUNTER — APPOINTMENT (OUTPATIENT)
Dept: NEUROLOGY | Facility: CLINIC | Age: 35
End: 2025-05-09
Payer: COMMERCIAL

## 2025-05-09 DIAGNOSIS — R93.0 ABNORMAL FINDINGS ON DIAGNOSTIC IMAGING OF SKULL AND HEAD, NOT ELSEWHERE CLASSIFIED: ICD-10-CM

## 2025-05-09 PROCEDURE — 99204 OFFICE O/P NEW MOD 45 MIN: CPT

## 2025-05-15 DIAGNOSIS — R74.01 ELEVATION OF LEVELS OF LIVER TRANSAMINASE LEVELS: ICD-10-CM

## 2025-05-20 ENCOUNTER — APPOINTMENT (OUTPATIENT)
Dept: OTOLARYNGOLOGY | Facility: CLINIC | Age: 35
End: 2025-05-20
Payer: COMMERCIAL

## 2025-05-20 VITALS
WEIGHT: 161 LBS | DIASTOLIC BLOOD PRESSURE: 54 MMHG | HEIGHT: 65 IN | HEART RATE: 59 BPM | SYSTOLIC BLOOD PRESSURE: 126 MMHG | BODY MASS INDEX: 26.82 KG/M2

## 2025-05-20 DIAGNOSIS — J34.2 DEVIATED NASAL SEPTUM: ICD-10-CM

## 2025-05-20 DIAGNOSIS — J34.3 HYPERTROPHY OF NASAL TURBINATES: ICD-10-CM

## 2025-05-20 DIAGNOSIS — J30.9 ALLERGIC RHINITIS, UNSPECIFIED: ICD-10-CM

## 2025-05-20 PROCEDURE — 99204 OFFICE O/P NEW MOD 45 MIN: CPT | Mod: 25

## 2025-05-20 PROCEDURE — 31231 NASAL ENDOSCOPY DX: CPT

## 2025-05-20 RX ORDER — AZELASTINE 137 UG/1
137 SPRAY, METERED NASAL TWICE DAILY
Qty: 1 | Refills: 4 | Status: ACTIVE | COMMUNITY
Start: 2025-05-20 | End: 1900-01-01

## 2025-05-20 RX ORDER — FLUTICASONE PROPIONATE 50 UG/1
50 SPRAY, METERED NASAL
Qty: 1 | Refills: 3 | Status: ACTIVE | COMMUNITY
Start: 2025-05-20 | End: 1900-01-01

## 2025-05-22 ENCOUNTER — NON-APPOINTMENT (OUTPATIENT)
Age: 35
End: 2025-05-22